# Patient Record
Sex: FEMALE | Race: WHITE | NOT HISPANIC OR LATINO | Employment: UNEMPLOYED | URBAN - METROPOLITAN AREA
[De-identification: names, ages, dates, MRNs, and addresses within clinical notes are randomized per-mention and may not be internally consistent; named-entity substitution may affect disease eponyms.]

---

## 2018-03-14 ENCOUNTER — OFFICE VISIT (OUTPATIENT)
Dept: OBSTETRICS AND GYNECOLOGY | Facility: CLINIC | Age: 28
End: 2018-03-14
Payer: MEDICAID

## 2018-03-14 VITALS
HEIGHT: 58 IN | HEART RATE: 88 BPM | DIASTOLIC BLOOD PRESSURE: 68 MMHG | WEIGHT: 150 LBS | BODY MASS INDEX: 31.49 KG/M2 | SYSTOLIC BLOOD PRESSURE: 120 MMHG

## 2018-03-14 DIAGNOSIS — N97.9 INFERTILITY, FEMALE: ICD-10-CM

## 2018-03-14 DIAGNOSIS — N92.6 IRREGULAR PERIODS/MENSTRUAL CYCLES: Primary | ICD-10-CM

## 2018-03-14 DIAGNOSIS — Z87.59 MISCARRIAGE WITHIN LAST 12 MONTHS: ICD-10-CM

## 2018-03-14 PROCEDURE — 99203 OFFICE O/P NEW LOW 30 MIN: CPT | Mod: PBBFAC | Performed by: OBSTETRICS & GYNECOLOGY

## 2018-03-14 PROCEDURE — 99204 OFFICE O/P NEW MOD 45 MIN: CPT | Mod: S$PBB,,, | Performed by: OBSTETRICS & GYNECOLOGY

## 2018-03-14 PROCEDURE — 99999 PR PBB SHADOW E&M-NEW PATIENT-LVL III: CPT | Mod: PBBFAC,,, | Performed by: OBSTETRICS & GYNECOLOGY

## 2018-03-14 RX ORDER — ESCITALOPRAM OXALATE 10 MG/1
10 TABLET ORAL DAILY
Qty: 30 TABLET | Refills: 2 | Status: SHIPPED | OUTPATIENT
Start: 2018-03-14 | End: 2019-03-14

## 2018-03-14 NOTE — PROGRESS NOTES
Subjective:    Patient ID: Wanda Dougherty is a 28 y.o. y.o. female.     Chief Complaint:   Chief Complaint   Patient presents with    Threatened Miscarriage       History of Present Illness:  Wanda presents today to discuss fertility and recurrent miscarriages. Patient reports having a miscarriage in 2010 for her current partner. She reports ultrasound revealed a fetal pole but no FHTs. She spontaneously passed the pregnancy. She reports they tried for another 8 years with no pregnancy. She recently got pregnant for a different partner in Feb of this year after 3-4 months of trying. She reports he has one other child. That pregnancy resulted in a fetal pole measuring 6 weeks 1 day with no FHTs and she spontaneously miscarried. She has not had a cycle since.     She is back with her prior partner. He reports not medical problems and no prior children. She reports having irregular cycles occurring every 3-4 weeks. Family history of endometriosis. No prior pelvic infections.     She also has a history of depression and anxiety which she reports has acutely worsened since the miscarriage. She reports feeling very depressed, multiple crying episodes and at times cannot get out of bed. She has previously been on Prozac, Celexa, Zoloft, Klonopin, and Xanax.       ROS:   Review of Systems   Constitutional: Negative for activity change, appetite change, chills, diaphoresis, fatigue, fever and unexpected weight change.   HENT: Negative for mouth sores and tinnitus.    Eyes: Negative for discharge and visual disturbance.   Respiratory: Negative for cough, shortness of breath and wheezing.    Cardiovascular: Negative for chest pain, palpitations and leg swelling.   Gastrointestinal: Negative for abdominal pain, bloating, blood in stool, constipation, diarrhea, nausea and vomiting.   Endocrine: Negative for diabetes, hair loss, hot flashes, hyperthyroidism and hypothyroidism.   Genitourinary: Negative for dyspareunia,  dysuria, flank pain, frequency, genital sores, hematuria, menorrhagia, menstrual problem, pelvic pain, urgency, vaginal bleeding, vaginal discharge, vaginal pain, dysmenorrhea, urinary incontinence, postcoital bleeding and vaginal odor.   Musculoskeletal: Negative for back pain, joint swelling and myalgias.   Skin:  Negative for rash and no acne.   Neurological: Negative for seizures, syncope, numbness and headaches.   Hematological: Negative for adenopathy. Does not bruise/bleed easily.   Psychiatric/Behavioral: Positive for depression. Negative for sleep disturbance. The patient is nervous/anxious.    Breast: Negative for breast mass, breast pain, nipple discharge and skin changes          Objective:    Vital Signs:  Vitals:    03/14/18 1352   BP: 120/68   Pulse: 88       Physical Exam:  General:  alert, cooperative, no distress   Head Normocephalic, without obvious abnormality, atraumatic   Neck .supple, symmetrical, trachea midline   Skin:  Skin color, texture, turgor normal. No rashes or lesions   Abdomen:  soft, non-tender; bowel sounds normal   Pelvis: External genitalia: normal general appearance  Urinary system: urethral meatus normal, bladder nontender  Vaginal: normal mucosa without prolapse or lesions  Cervix: normal appearance  Uterus: normal size, shape, position  Adnexa: normal size, nontender bilaterally   Extremities extremities normal, atraumatic, no cyanosis or edema   Neurologic Grossly normal            Assessment:      1. Irregular periods/menstrual cycles    2. Miscarriage within last 12 months    3. Infertility, female          Plan:      PLAN:   1. Discussed starting an SSRI for depression; Rx sent of Lexapro. Will follow up in 1 month  2. Discussed menstrual calendar  3. Discussed possible need for SA; will re-address at next visit  4. Will order HSG      Time spent with patient 30 minutes with >50% face to face time spent preforming counseling and coordination of care

## 2018-03-15 ENCOUNTER — TELEPHONE (OUTPATIENT)
Dept: OBSTETRICS AND GYNECOLOGY | Facility: CLINIC | Age: 28
End: 2018-03-15

## 2018-03-15 DIAGNOSIS — N97.9 INFERTILITY, FEMALE: Primary | ICD-10-CM

## 2018-03-15 DIAGNOSIS — Z01.812 PRE-PROCEDURE LAB EXAM: ICD-10-CM

## 2018-03-15 NOTE — TELEPHONE ENCOUNTER
----- Message from Sammie Blevins MD sent at 3/14/2018  2:26 PM CDT -----  Please find out if insurance covers HSG and if so schedule.

## 2018-03-15 NOTE — TELEPHONE ENCOUNTER
Pt instructed to contact office on day one of menses to schedule HSG testing. Pt verbalized understanding. HSG and rapid urine pended.

## 2020-09-02 ENCOUNTER — TELEPHONE (OUTPATIENT)
Dept: OBSTETRICS AND GYNECOLOGY | Facility: CLINIC | Age: 30
End: 2020-09-02

## 2020-09-02 NOTE — TELEPHONE ENCOUNTER
----- Message from Vanda Pool sent at 9/2/2020  2:28 PM CDT -----  Contact: [phone]  Type:  Needs Medical Advice    Who Called: PT  Symptoms (please be specific):  Severe Cramping and pt is not on her cycle and this Morning she had a Blood clot while Urinating and pt would like to speak with the nurse regarding the pain. Pt states she has a History of Ovarian Cyst  How long has patient had these symptoms:  4 days  Pharmacy name and phone #:  N/a  Would the patient rather a call back or a response via [facility]? Call back  Best Call Back Number: [phone]  Additional Information:  pt states the pain is in her Lower Back and Pelvic  Area

## 2020-09-04 ENCOUNTER — OFFICE VISIT (OUTPATIENT)
Dept: OBSTETRICS AND GYNECOLOGY | Facility: CLINIC | Age: 30
End: 2020-09-04
Payer: MEDICAID

## 2020-09-04 ENCOUNTER — LAB VISIT (OUTPATIENT)
Dept: LAB | Facility: HOSPITAL | Age: 30
End: 2020-09-04
Attending: OBSTETRICS & GYNECOLOGY
Payer: MEDICAID

## 2020-09-04 VITALS
SYSTOLIC BLOOD PRESSURE: 120 MMHG | HEART RATE: 125 BPM | BODY MASS INDEX: 32.46 KG/M2 | HEIGHT: 57 IN | DIASTOLIC BLOOD PRESSURE: 74 MMHG

## 2020-09-04 DIAGNOSIS — N91.2 AMENORRHEA: ICD-10-CM

## 2020-09-04 DIAGNOSIS — N91.2 AMENORRHEA: Primary | ICD-10-CM

## 2020-09-04 DIAGNOSIS — R10.2 PELVIC PAIN IN FEMALE: ICD-10-CM

## 2020-09-04 LAB
B-HCG UR QL: NEGATIVE
CTP QC/QA: YES
HCG INTACT+B SERPL-ACNC: <1.2 MIU/ML

## 2020-09-04 PROCEDURE — 36415 COLL VENOUS BLD VENIPUNCTURE: CPT

## 2020-09-04 PROCEDURE — 84702 CHORIONIC GONADOTROPIN TEST: CPT

## 2020-09-04 PROCEDURE — 99999 PR PBB SHADOW E&M-EST. PATIENT-LVL III: CPT | Mod: PBBFAC,,, | Performed by: OBSTETRICS & GYNECOLOGY

## 2020-09-04 PROCEDURE — 99213 OFFICE O/P EST LOW 20 MIN: CPT | Mod: S$PBB,,, | Performed by: OBSTETRICS & GYNECOLOGY

## 2020-09-04 PROCEDURE — 99213 OFFICE O/P EST LOW 20 MIN: CPT | Mod: PBBFAC | Performed by: OBSTETRICS & GYNECOLOGY

## 2020-09-04 PROCEDURE — 99213 PR OFFICE/OUTPT VISIT, EST, LEVL III, 20-29 MIN: ICD-10-PCS | Mod: S$PBB,,, | Performed by: OBSTETRICS & GYNECOLOGY

## 2020-09-04 PROCEDURE — 99999 PR PBB SHADOW E&M-EST. PATIENT-LVL III: ICD-10-PCS | Mod: PBBFAC,,, | Performed by: OBSTETRICS & GYNECOLOGY

## 2020-09-04 RX ORDER — MEDROXYPROGESTERONE ACETATE 10 MG/1
10 TABLET ORAL DAILY
Qty: 10 TABLET | Refills: 0 | Status: SHIPPED | OUTPATIENT
Start: 2020-09-04 | End: 2020-09-14

## 2020-09-04 NOTE — PROGRESS NOTES
Subjective:    Patient ID: Wanda Doguherty is a 30 y.o. y.o. female.     Chief Complaint:   Chief Complaint   Patient presents with    Cramping     possible cyst       History of Present Illness:  Wanda presents today complaining of missed cycle. She reports cycles have been every 24 days but she is now 2 weeks late. Reports cramping present for the past week. Reports constant pain present and is worsening. Pain present in lower back and in bilateral lower quadrants.       ROS:   Review of Systems   Constitutional: Negative for activity change, appetite change, chills, diaphoresis, fatigue, fever and unexpected weight change.   HENT: Negative for mouth sores and tinnitus.    Eyes: Negative for discharge and visual disturbance.   Respiratory: Negative for cough, shortness of breath and wheezing.    Cardiovascular: Negative for chest pain, palpitations and leg swelling.   Gastrointestinal: Negative for abdominal pain, bloating, blood in stool, constipation, diarrhea, nausea and vomiting.   Endocrine: Negative for diabetes, hair loss, hot flashes, hyperthyroidism and hypothyroidism.   Genitourinary: Positive for dysmenorrhea, menstrual problem and pelvic pain. Negative for dysuria, flank pain, frequency, genital sores, hematuria, urgency, vaginal bleeding, vaginal discharge, vaginal pain, postcoital bleeding and vaginal odor.   Musculoskeletal: Negative for back pain, joint swelling and myalgias.   Integumentary:  Negative for rash, acne, breast mass, nipple discharge and breast skin changes.   Neurological: Negative for seizures, syncope, numbness and headaches.   Hematological: Negative for adenopathy. Does not bruise/bleed easily.   Psychiatric/Behavioral: Negative for depression and sleep disturbance. The patient is not nervous/anxious.    Breast: Negative for mass, mastodynia, nipple discharge and skin changes          Objective:    Vital Signs:  Vitals:    09/04/20 1538   BP: 120/74   Pulse: (!) 125        Physical Exam:  General:  alert, cooperative, no distress   Head Normocephalic, without obvious abnormality, atraumatic   Neck .supple, symmetrical, trachea midline   Skin:  Skin color, texture, turgor normal. No rashes or lesions   Abdomen:  soft, non-tender; bowel sounds normal   Pelvis: External genitalia: normal general appearance  Urinary system: urethral meatus normal, bladder nontender  Vaginal: normal mucosa without prolapse or lesions  Cervix: normal appearance  Uterus: normal size, shape, position  Adnexa: normal size, nontender bilaterally   Extremities extremities normal, atraumatic, no cyanosis or edema   Neurologic Grossly normal        UPT: negative    Assessment:      1. Amenorrhea    2. Pelvic pain in female          Plan:      PLAN:   1. Rx of Provera  2. TVUS

## 2020-09-08 ENCOUNTER — TELEPHONE (OUTPATIENT)
Dept: OBSTETRICS AND GYNECOLOGY | Facility: CLINIC | Age: 30
End: 2020-09-08

## 2020-09-08 NOTE — TELEPHONE ENCOUNTER
----- Message from Percy Torres LPN sent at 9/8/2020  9:54 AM CDT -----  Patient came in for late cycle and u/s scheduled for Thursday.  Pt started her cycle over the weekend and would like to know if she still needs to have u/s? Pt can be reached at [phone].

## 2020-09-08 NOTE — TELEPHONE ENCOUNTER
Patient stated she started her cycle, still have back pain, suggested to call for office if still having pain in a few days.

## 2020-09-14 ENCOUNTER — TELEPHONE (OUTPATIENT)
Dept: OBSTETRICS AND GYNECOLOGY | Facility: CLINIC | Age: 30
End: 2020-09-14

## 2020-09-14 NOTE — TELEPHONE ENCOUNTER
Attempted to contact patient to follow up on pain, no answer. Unable to leave voicemail, mailbox not set up.